# Patient Record
Sex: FEMALE
[De-identification: names, ages, dates, MRNs, and addresses within clinical notes are randomized per-mention and may not be internally consistent; named-entity substitution may affect disease eponyms.]

---

## 2019-09-19 ENCOUNTER — HOSPITAL ENCOUNTER (OUTPATIENT)
Dept: HOSPITAL 88 - MAMMO | Age: 49
End: 2019-09-19
Attending: OBSTETRICS & GYNECOLOGY
Payer: COMMERCIAL

## 2019-09-19 DIAGNOSIS — N60.12: Primary | ICD-10-CM

## 2019-09-19 PROCEDURE — 77066 DX MAMMO INCL CAD BI: CPT

## 2019-09-20 NOTE — DIAGNOSTIC IMAGING REPORT
#[id number] - [id number]

#BILATERAL DIGITAL DIAGNOSTIC MAMMOGRAM WITH CAD: 9/19/2019

CLINICAL: Palpable lump left breast.  



Comparison is made to exams dated:  9/29/2017 mammogram and 10/15/2014 mammogram - [facility].  Current study contains 7 films.  

The tissue of both breasts is heterogeneously dense. This may lower the sensitivity of mammography. 

 

Current study was also evaluated with a Computer Aided Detection (CAD) system.  

Benign appearing calcifications are noted in the right breast.  

No significant masses, calcifications, or other findings are seen in either breast.  



IMPRESSION: BENIGN

See the report for ultrasound performed the same day for additional details.  

There is no mammographic evidence of malignancy.  A 1 year screening mammogram is recommended. 

The patient will be notified by letter of the results.  





JEFFERSON GRISSOM M.D.          

ct/penrad:9/19/2019 14:56:31  



Imaging Technologist: Kennedi FOREMAN(IGNACIA)(JULISSA), [facility]

letter sent: Normal Exam  

Mammogram BI-RADS: 2 Benign

## 2019-09-20 NOTE — DIAGNOSTIC IMAGING REPORT
#[id number] - [id number]

ULTRASOUND OF THE LEFT BREAST : 9/19/2019

Comparison is made to exam dated:  9/19/2019 mammogram - [facility].  

Real-time ultrasound was performed on the left breast.  

There is a benign cluster of cysts in the left breast at 2 o'clock in the retroareolar region.  This

 cluster of cysts is anechoic.  This correlates as palpated.  





IMPRESSION: BENIGN 

There is no sonographic evidence of malignancy.  

The cluster of cysts in the left breast is benign.  

A 1 year screening mammogram is recommended.



JEFFERSON GRISSOM M.D.  

ct/penrad:9/19/2019 14:57:32  



Imaging Technologist: MILENA JOSEPH RDMS, [facility]

letter sent: Normal Exam  

Ultrasound BI-RADS: 2 Benign

## 2021-05-24 ENCOUNTER — HOSPITAL ENCOUNTER (OUTPATIENT)
Dept: HOSPITAL 88 - MAMMO | Age: 51
End: 2021-05-24
Attending: INTERNAL MEDICINE
Payer: COMMERCIAL

## 2021-05-24 DIAGNOSIS — Z12.31: Primary | ICD-10-CM

## 2021-05-24 PROCEDURE — 77067 SCR MAMMO BI INCL CAD: CPT

## 2022-09-20 ENCOUNTER — HOSPITAL ENCOUNTER (OUTPATIENT)
Dept: HOSPITAL 88 - MAMMO | Age: 52
End: 2022-09-20
Attending: OBSTETRICS & GYNECOLOGY
Payer: COMMERCIAL

## 2022-09-20 DIAGNOSIS — Z12.31: Primary | ICD-10-CM

## 2022-09-20 PROCEDURE — 77067 SCR MAMMO BI INCL CAD: CPT
